# Patient Record
Sex: MALE | Race: WHITE | NOT HISPANIC OR LATINO | Employment: OTHER | URBAN - METROPOLITAN AREA
[De-identification: names, ages, dates, MRNs, and addresses within clinical notes are randomized per-mention and may not be internally consistent; named-entity substitution may affect disease eponyms.]

---

## 2021-05-26 ENCOUNTER — RECORDS - HEALTHEAST (OUTPATIENT)
Dept: ADMINISTRATIVE | Facility: CLINIC | Age: 66
End: 2021-05-26

## 2021-05-27 ENCOUNTER — RECORDS - HEALTHEAST (OUTPATIENT)
Dept: ADMINISTRATIVE | Facility: CLINIC | Age: 66
End: 2021-05-27

## 2021-05-28 ENCOUNTER — RECORDS - HEALTHEAST (OUTPATIENT)
Dept: ADMINISTRATIVE | Facility: CLINIC | Age: 66
End: 2021-05-28

## 2022-09-03 ENCOUNTER — HOSPITAL ENCOUNTER (EMERGENCY)
Facility: HOSPITAL | Age: 67
Discharge: HOME | End: 2022-09-03
Attending: EMERGENCY MEDICINE | Admitting: EMERGENCY MEDICINE
Payer: COMMERCIAL

## 2022-09-03 VITALS
TEMPERATURE: 97.8 F | DIASTOLIC BLOOD PRESSURE: 81 MMHG | SYSTOLIC BLOOD PRESSURE: 162 MMHG | HEART RATE: 76 BPM | BODY MASS INDEX: 27.32 KG/M2 | RESPIRATION RATE: 17 BRPM | OXYGEN SATURATION: 95 % | WEIGHT: 162.92 LBS

## 2022-09-03 DIAGNOSIS — T78.2XXA ANAPHYLAXIS, INITIAL ENCOUNTER: ICD-10-CM

## 2022-09-03 PROCEDURE — 99283 EMERGENCY DEPT VISIT LOW MDM: CPT

## 2022-09-03 RX ORDER — EPINEPHRINE 0.3 MG/.3ML
0.3 INJECTION SUBCUTANEOUS
Qty: 2 EACH | Refills: 0 | Status: SHIPPED | OUTPATIENT
Start: 2022-09-03

## 2022-09-03 NOTE — ED NOTES
Bed: JNEDP-05  Expected date: 9/3/22  Expected time:   Means of arrival: Ambulance  Comments:  Xu carmichaeln

## 2022-09-03 NOTE — ED NOTES
Pt presents NAD. SKIN: NWD.   HEENT: normocephalic, PERRL, clear x 3.   CHEST: symmetrical rise, CEBBS, no CP or SOB.  ABD: NTND, no N&V or diarrhea.  EXT: FARRELL x 4  Rest of exam unremarkable.

## 2022-09-03 NOTE — ED PROVIDER NOTES
EMERGENCY DEPARTMENT ENCOUNTER      NAME: Joseph Keith Jr.  AGE: 67 year old male  YOB: 1955  MRN: [id number]  EVALUATION DATE & TIME: 9/3/2022  3:12 PM    PCP: No primary care provider on file.    ED PROVIDER: Lou Gregory M.D.      No chief complaint on file.    FINAL IMPRESSION:  1. Anaphylaxis, initial encounter      ED COURSE & MEDICAL DECISION MAKING:    Pertinent Labs & Imaging studies reviewed. (See chart for details)    3:26 PM I met with the patient to gather history and to perform my initial exam. We discussed plans for the ED course, including diagnostic testing and treatment.    3:31 PM I discussed plans for discharge with the patient, which they were agreeable to. We discussed supportive cares at home and reasons for return to the ER including new or worsening symptoms. All questions and concerns were addressed. Patient to be discharged by RN.     ED Course as of 09/03/22 1558   Sat Sep 03, 2022   1534 Patient is a 67-year-old male who comes in today for evaluation of anaphylaxis.  He was initially seen at urgency room whether he had exposure to peanuts which she has known allergy for.  He had reported some tightness in his throat as well as a little bit of wheezing.  He had reported hives.  He got Benadryl, Solu-Medrol, Pepcid, and an epi IM dose.  He feels back to his normal self now.  Symptoms came on and about 15 minutes and escalated over the course of another half hour before he decided to go in.  He was never hypotensive or dizzy or had syncope.  It does not sound like he was in severe distress.  He does not have an EpiPen at home but I will prescribe one for him.  I do not think he needs any further monitoring or work-up at this time and we will get him discharged home.  He is comfortable with the plan.       At the conclusion of the encounter I discussed  the results of all of the tests and the disposition with patient.   All questions were answered.  The patient  "acknowledged understanding and was involved in the decision making regarding the overall care plan.      I discussed with patient the utility, limitations and findings of the exam/interventions/studies done during this visit as well as the list of differential diagnosis and symptoms to monitor/return to ER for.  Additional verbal discharge instructions were provided.     MEDICATIONS GIVEN IN THE EMERGENCY:  Medications - No data to display    NEW PRESCRIPTIONS STARTED AT TODAY'S ER VISIT  New Prescriptions    EPINEPHRINE (ANY BX GENERIC EQUIV) 0.3 MG/0.3ML INJECTION 2-PACK    Inject 0.3 mLs (0.3 mg) into the muscle once as needed for anaphylaxis May repeat one time in 5-15 minutes if response to initial dose is inadequate.      =================================================================    HPI    Triage Note:   Pt brought in by [facility]. Pt states at 1255 today he ate a salad that had peanut oil in the dressing, and he is apparently very allergic to peanuts. Pt's eyes became itchy and irritated, he had some generalized hives, and bilateral hand and foot swelling, burning, and itching shortly after. Pt denies any lip or tongue swelling. Pt has had several anaphylactic reactions in the past. Pt was at urgent care pta. Prior to him going there, he took Benadryl 100 mg with minimal relief. While at urgent care he had Solu-Medrol and Epi 0.3 mg IM at 1430. Pt now states he is feeling \"almost back to normal.\"     Triage Assessment     Row Name 09/03/22 1520       Triage Assessment (Adult)    Airway WDL WDL       Respiratory WDL    Respiratory WDL WDL       Skin Circulation/Temperature WDL    Skin Circulation/Temperature WDL WDL       Cardiac WDL    Cardiac WDL WDL       Peripheral/Neurovascular WDL    Peripheral Neurovascular WDL WDL       Cognitive/Neuro/Behavioral WDL    Cognitive/Neuro/Behavioral WDL WDL              Patient information was obtained from: Patient     Use of : N/A       Joseph Keith "  is a 67 year old male with a pertinent medical history of allergic rhinitis, asthma, CAD, malignant melanoma of skin, and HLD, who presents an allergic reaction.    Per Chart Review, patient was seen at [facility] earlier today for evaluation of facial swelling following accidental consumption of peanuts which he has a known allergy to. He was given Solu-Medrol and Pepcid immediately. He was watched for some time, but about 25 minutes later he stated he was having more itching and little bit more fullness in his throat so epinephrine was provided as well. Patient was then prompted to head straight to the [facility] ED for continued observation and treatment due to significant anaphylactic past history and need for epinephrine.    Patient endorses the history above and notes that he accidentally consumed peanuts because he did not know that it was an ingredient in a sauce that he used as dressing. He endorses developing eye itchiness and hand itchiness approximately 15 minutes after consuming the peanuts. He notes that then the itchiness progressively worsened and became generalized. He additionally endorses then gradually developing generalized hives, mouth swelling, tongue swelling, mild throat tightness, shortness of breath, and wheezing. He states that approximately 45 minutes after the onset of his symptoms he decided he needed to head to [facility]. He endorses taking 100 mg Benadryl prior to heading to [facility]. He reports that within the ED he feels at his baseline state of health and his normal self. He denies any recent dizziness or any other complications at this time. He denies having an EpiPen and home and notes he was not prescribed an EpiPen at [facility]. He mentions that he has not had a reaction to a food allergy in years.     REVIEW OF SYSTEMS   Except as stated in the HPI all other systems reviewed and are negative.    PAST MEDICAL HISTORY:  Past  Medical History:   Diagnosis Date     Allergic rhinitis due to animal dander      Desensitisation to allergens IT done 1/92-2/06 for: DM/M/T/G/W     Diagnostic skin and sensitization tests 1/92 skin tests pos. for: cat/dog/DM/M/T/G/W     House dust mite allergy     1/92 skin tests pos. for: cat/dog/DM/M/T/G/W     Intermittent asthma      Seasonal allergic conjunctivitis      Seasonal allergic rhinitis        PAST SURGICAL HISTORY:  No past surgical history on file.    CURRENT MEDICATIONS:    No current facility-administered medications for this encounter.    Current Outpatient Medications:      EPINEPHrine (ANY BX GENERIC EQUIV) 0.3 MG/0.3ML injection 2-pack, Inject 0.3 mLs (0.3 mg) into the muscle once as needed for anaphylaxis May repeat one time in 5-15 minutes if response to initial dose is inadequate., Disp: 2 each, Rfl: 0     albuterol (PROAIR HFA, PROVENTIL HFA, VENTOLIN HFA) 108 (90 BASE) MCG/ACT inhaler, Inhale 2 puffs into the lungs every 4 hours as needed for shortness of breath / dyspnea or wheezing, Disp: 1 Inhaler, Rfl: 5     Fexofenadine HCl (ALLEGRA ALLERGY PO), , Disp: , Rfl:      fluticasone (FLONASE) 50 MCG/ACT nasal spray, Spray 1-2 sprays into both nostrils daily, Disp: 1 Package, Rfl: 9     Hypertonic Nasal Wash (SINUS RINSE BOTTLE KIT) PACK, Spray 1 Bottle in nostril daily as needed, Disp: , Rfl:      QVAR 80 MCG/ACT Inhaler, Inhale 1 puff into the lungs 2 times daily Or as directed., Disp: 1 Inhaler, Rfl: 11    ALLERGIES:  Allergies   Allergen Reactions     Penicillins Hives       FAMILY HISTORY:  No family history on file.    SOCIAL HISTORY:   Social History     Socioeconomic History     Marital status:    Tobacco Use     Smoking status: Never Smoker     Smokeless tobacco: Never Used   Substance and Sexual Activity     Alcohol use: Yes     Drug use: No     Sexual activity: Yes     Partners: Female       PHYSICAL EXAM    VITAL SIGNS: BP (!) 162/81   Pulse 76   Temp 97.8  F (36.6  C)  (Oral)   Resp 17   Wt 73.9 kg (162 lb 14.7 oz)   SpO2 95%   BMI 27.32 kg/m     GENERAL: Awake, Alert, answering questions, No acute distress, Well nourished  HEENT: Normal cephalic, Atraumatic, bilateral external ears normal, No scleral icterus, open oropharynx and no tongue or mouth swelling, mask in place  NECK: No obvious swelling or abnormality, No stridor  PULMONARY:Normal and symmetric breath sounds, No respiratory distress, Lungs clear to auscultation bilaterally. No wheezing  CARDIOVASCULAR: Regular rate and rhythm, Distal pulses present and normal.  ABDOMINAL: Soft, Nondistended, Nontender, No flank tenderness, No palpable masses  BACK: No tenderness.  EXTREMITIES: Moves all extremities spontaneously, warm, no edema, No major deformities  NEURO: No facial droop, normal motor function, Normal speech   PSYCH: Normal mood and affect  SKIN: Slight rash present at abdomen. Dry, warm     I, Quinn Macias, am serving as a scribe to document services personally performed by Dr. Gregory based on my observation and the provider's statements to me. I, Lou Gregory MD attest that Quinn Macias is acting in a scribe capacity, has observed my performance of the services and has documented them in accordance with my direction.    Lou Gregory M.D.  Emergency Medicine  [facility] EMERGENCY DEPARTMENT  [facility] [facility] [address]  [phone]  Dept: [phone]       Lou Gregory MD  09/03/22 5758

## 2022-09-03 NOTE — ED TRIAGE NOTES
"Pt brought in by [facility]. Pt states at 1255 today he ate a salad that had peanut oil in the dressing, and he is apparently very allergic to peanuts. Pt's eyes became itchy and irritated, he had some generalized hives, and bilateral hand and foot swelling, burning, and itching shortly after. Pt denies any lip or tongue swelling. Pt has had several anaphylactic reactions in the past. Pt was at urgent care pta. Prior to him going there, he took Benadryl 100 mg with minimal relief. While at urgent care he had Solu-Medrol and Epi 0.3 mg IM at 1430. Pt now states he is feeling \"almost back to normal.\"     Triage Assessment     Row Name 09/03/22 1520       Triage Assessment (Adult)    Airway WDL WDL       Respiratory WDL    Respiratory WDL WDL       Skin Circulation/Temperature WDL    Skin Circulation/Temperature WDL WDL       Cardiac WDL    Cardiac WDL WDL       Peripheral/Neurovascular WDL    Peripheral Neurovascular WDL WDL       Cognitive/Neuro/Behavioral WDL    Cognitive/Neuro/Behavioral WDL WDL              "

## 2022-09-03 NOTE — ED NOTES
Expected Patient Referral to ED  2:36 PM    Referring Clinic/Provider:  [facility]    Reason for referral/Clinical facts:  Allergic reaction to peanut.  Has has anaphylaxis in the past.  Took benadryl prior to arrival, received Pepcid, solumedrol, epi    Recommendations provided:  Send to ED for further evaluation    Caller was informed that this institution does possess the capabilities and/or resources to provide for patient and should be transferred to our facility.    Discussed that if direct admit is sought and any hurdles are encountered, this ED would be happy to see the patient and evaluate.    Informed caller that recommendations provided are recommendations based only on the facts provided and that they responsible to accept or reject the advice, or to seek a formal in person consultation as needed and that this ED will see/treat patient should they arrive.      Rito Siddiqui MD  [facility] EMERGENCY DEPARTMENT  [address] [place] [id number]  [phone]       Rito Siddiqui MD  09/03/22 5759

## 2022-09-03 NOTE — DISCHARGE INSTRUCTIONS
You were seen in the Emergency Department today for evaluation of anaphylaxis. You were prescribed an EpiPen. You should take all medications as prescribed.  Follow up with your primary care physician to ensure resolution of symptoms. Return if you have new or worsening symptoms.

## 2022-10-22 ENCOUNTER — HEALTH MAINTENANCE LETTER (OUTPATIENT)
Age: 67
End: 2022-10-22

## 2023-11-05 ENCOUNTER — HEALTH MAINTENANCE LETTER (OUTPATIENT)
Age: 68
End: 2023-11-05

## 2024-12-22 ENCOUNTER — HEALTH MAINTENANCE LETTER (OUTPATIENT)
Age: 69
End: 2024-12-22